# Patient Record
Sex: FEMALE | Race: WHITE | Employment: FULL TIME | URBAN - METROPOLITAN AREA
[De-identification: names, ages, dates, MRNs, and addresses within clinical notes are randomized per-mention and may not be internally consistent; named-entity substitution may affect disease eponyms.]

---

## 2017-08-28 ENCOUNTER — OFFICE VISIT (OUTPATIENT)
Dept: DERMATOLOGY | Age: 57
End: 2017-08-28

## 2017-08-28 DIAGNOSIS — D22.9 MULTIPLE NEVI: ICD-10-CM

## 2017-08-28 DIAGNOSIS — B07.9 VIRAL WARTS, UNSPECIFIED TYPE: ICD-10-CM

## 2017-08-28 DIAGNOSIS — L57.0 AK (ACTINIC KERATOSIS): ICD-10-CM

## 2017-08-28 DIAGNOSIS — R20.9 DISTURBANCE OF SKIN SENSATION: ICD-10-CM

## 2017-08-28 DIAGNOSIS — D23.9 DERMATOFIBROMA: ICD-10-CM

## 2017-08-28 DIAGNOSIS — L81.4 LENTIGO: ICD-10-CM

## 2017-08-28 DIAGNOSIS — Z85.828 HISTORY OF SKIN CANCER: Primary | ICD-10-CM

## 2017-08-28 PROCEDURE — 99213 OFFICE O/P EST LOW 20 MIN: CPT | Performed by: DERMATOLOGY

## 2017-08-28 PROCEDURE — 17000 DESTRUCT PREMALG LESION: CPT | Performed by: DERMATOLOGY

## 2017-08-28 PROCEDURE — 17003 DESTRUCT PREMALG LES 2-14: CPT | Performed by: DERMATOLOGY

## 2017-08-28 PROCEDURE — 17110 DESTRUCTION B9 LES UP TO 14: CPT | Performed by: DERMATOLOGY

## 2017-10-05 ENCOUNTER — OFFICE VISIT (OUTPATIENT)
Dept: DERMATOLOGY | Age: 57
End: 2017-10-05

## 2017-10-05 DIAGNOSIS — Z41.1 ELECTIVE PROCEDURE FOR UNACCEPTABLE COSMETIC APPEARANCE: ICD-10-CM

## 2017-10-05 DIAGNOSIS — L81.4 LENTIGINES: Primary | ICD-10-CM

## 2017-10-05 PROCEDURE — DM01515 PIGMENTED LASER 11-20 LESIONS: Performed by: DERMATOLOGY

## 2017-10-05 NOTE — MR AVS SNAPSHOT
After Visit Summary             Valentina Fowler   10/5/2017 2:00 PM   Office Visit    Description:  Female : 1960   Provider:  Shaan Prado MD   Department:  [facility]              Your Follow-Up and Future Appointments         Below is a list of your follow-up and future appointments. This may not be a complete list as you may have made appointments directly with providers that we are not aware of or your providers may have made some for you. Please call your providers to confirm appointments. It is important to keep your appointments. Please bring your current insurance card, photo ID, co-pay, and all medication bottles to your appointment. If self-pay, payment is expected at the time of service. Your To-Do List     Future Appointments Provider Department Dept Phone    2018 8:45 AM Shaan Prado MD [facility] [phone]    Please arrive 15 minutes prior to appointment, bring photo ID and insurance card. Information from Your Visit        Department     Name Address Phone Fax    [facility] [address] [address] [phone]      Vital Signs     Smoking Status                   Never Smoker           Instructions    Following the procedure, the treated area may be red and crusted and blistering or scabbing may occur. The affected areas should be treated delicately following treatment. Discomfort may be treated with the application of cool compresses and the area should be cleaned gently daily with mild soap and water and covered with Vaseline or aquaphor daily until healed. Improper care of the treated area while healing may increase the chance of scarring or skin textural changes to the treated area. Avoid tanning or self-tanners while undergoing treatment - tan skin increases your chances or dyspigmentation and complications. Call the office if you have fever, chills, or excess pain that is not relieved with Tylenol or inbuprofen, or any other concerns or questions following your treatment. Medications and Orders      Your Current Medications Are              lansoprazole (PREVACID) 15 MG capsule Take 15 mg by mouth daily. fluorouracil (EFUDEX) 5 % cream Apply topically qhs and cover with bandage. VAGIFEM 10 MCG TABS vaginal tablet     tretinoin (RETIN-A) 0.025 % cream Apply topically nightly. Allergies              Bee Venom     Bee stings         Additional Information        Basic Information     Date Of Birth Sex Race Ethnicity Preferred Language    1960 Female White Non-/Non  English      Preventive Care        Date Due    Hepatitis C screening is recommended for all adults regardless of risk factors born between [facility] at least once (lifetime) who have never been tested. 1960    HIV screening is recommended for all people regardless of risk factors  aged 15-65 years at least once (lifetime) who have never been HIV tested. 9/22/1977    Tetanus Combination Vaccine (1 - Tdap) 9/22/1981    Pap Smear 9/22/1983    Cholesterol Screening 9/22/2002    Mammograms are recommended every 2 years for low/average risk patients aged 48 - 69, and every year for high risk patients per updated national guidelines. However these guidelines can be individualized by your provider. 9/22/2012    Colonoscopy 9/22/2012    Yearly Flu Vaccine (1) 9/1/2017            MyChart Signup           [employer] allows you to send messages to your doctor, view your test results, renew your prescriptions, schedule appointments, view visit notes, and more. How Do I Sign Up? 1. In your Internet browser, go to [email or web address]  2. Click on the Sign Up Now link in the Sign In box. You will see the New Member Sign Up page. 3. Enter your [employer] Access Code exactly as it appears below. You will not need to use this code after youve completed the sign-up process. If you do not sign up before the expiration date, you must request a new code. [employer] Access Code: [email or web address]  Expires: 10/27/2017  8:53 AM    4. Enter your Social Security Number ([id number]) and Date of Birth (mm/dd/yyyy) as indicated and click Submit. You will be taken to the next sign-up page. 5. Create a XunLight ID. This will be your [employer] login ID and cannot be changed, so think of one that is secure and easy to remember. 6. Create a [employer] password. You can change your password at any time. 7. Enter your Password Reset Question and Answer. This can be used at a later time if you forget your password. 8. Enter your e-mail address. You will receive e-mail notification when new information is available in [address]. 9. Click Sign Up. You can now view your medical record. Additional Information  If you have questions, please contact the physician practice where you receive care. Remember, [employer] is NOT to be used for urgent needs. For medical emergencies, dial 911. For questions regarding your [employer] account call [phone]. If you have a clinical question, please call your doctor's office.

## 2017-10-05 NOTE — PROGRESS NOTES
[facility]  Sharon Herrera MD  [phone]      Laser Procedure Note       Vitaly Shelton   YOB: 1960    DATE OF VISIT:  10/5/2017    LASER: IPL - last was 1-2016  DIAGNOSIS: lentigo    Here for trx of increasing wrinkles and sunspots on the face and chest over the past few years. She has had IPL a few times by me in the past year and one other time in the past (by an IM friend in [place] > 5 years ago) with significant improvement in lentigines. No other trx. No complications with previous trx. No tan today - we discussed risks of dyspigmentation. Chest and face with multiple tan macules    Previous baseline:              PATIENT IDENTIFIED PER PROTOCOL: yes  LOCATION(S): chest and face, hands  VERIFIED AND MARKED: yes  TECHNIQUES, RISKS, BENEFITS AND ALTERNATIVES EXPLAINED: yes  CONSENT SIGNED, WITNESSED AND DATED: yes      OPERATIVE REPORT    DIAGNOSIS, LOCATION, PROCEDURE RECONFIRMED: yes   EYE PROTECTION: yes  ANESTHESIA/PRE-OP MEDICATIONS: none  LASER SETTINGS:  (1)  IPL - ST 3, 10-10.5 (previously at 9.5 and 10) J    PROCEDURE NOTE:  US gel applied to chest and arms and hands and these areas were treated with a single pass with the above settings. No complications noted. POST-OPERATIVE CARE/DISPOSITION: aquaphor prn  COMPLICATIONS: none  MEDICATIONS: none  WOUND CARE INSTRUCTIONS PROVIDED: yes    F/u 2-3 mos if needed.     250 today (200 - 250 in the past)

## 2017-11-08 ENCOUNTER — TELEPHONE (OUTPATIENT)
Dept: DERMATOLOGY | Age: 57
End: 2017-11-08

## 2017-11-08 NOTE — TELEPHONE ENCOUNTER
Pt c/b [phone]  Pt states:   - to have Dr. Stella Oneil to return her call  Please all to discuss thanks

## 2017-11-09 NOTE — TELEPHONE ENCOUNTER
Pt c/b [phone]  Pt states:   - would like to talk to Dr. Tanya Vela   - returning Dr. Tanya Vela call  Please call to discuss thanks

## 2018-01-04 ENCOUNTER — TELEPHONE (OUTPATIENT)
Dept: DERMATOLOGY | Age: 58
End: 2018-01-04

## 2018-01-04 NOTE — TELEPHONE ENCOUNTER
Pt is calling to schedule a quick 5 minute appt to have a wart frozen on her foot. Pt stated Dr. Maximino Mustafa said she'd squeeze her in.

## 2018-01-09 ENCOUNTER — OFFICE VISIT (OUTPATIENT)
Dept: DERMATOLOGY | Age: 58
End: 2018-01-09

## 2018-01-09 DIAGNOSIS — R20.9 DISTURBANCE OF SKIN SENSATION: ICD-10-CM

## 2018-01-09 DIAGNOSIS — B95.8 STAPH INFECTION: Primary | ICD-10-CM

## 2018-01-09 DIAGNOSIS — B07.8 OTHER VIRAL WARTS: ICD-10-CM

## 2018-01-09 PROCEDURE — 17110 DESTRUCTION B9 LES UP TO 14: CPT | Performed by: DERMATOLOGY

## 2018-01-09 PROCEDURE — 99213 OFFICE O/P EST LOW 20 MIN: CPT | Performed by: DERMATOLOGY

## 2018-01-11 ENCOUNTER — TELEPHONE (OUTPATIENT)
Dept: DERMATOLOGY | Age: 58
End: 2018-01-11

## 2018-01-11 LAB
GRAM STAIN RESULT: ABNORMAL
ORGANISM: ABNORMAL
WOUND/ABSCESS: ABNORMAL
WOUND/ABSCESS: ABNORMAL

## 2018-01-11 NOTE — TELEPHONE ENCOUNTER
----- Message from Amanda Restrepo MD sent at 1/10/2018 10:48 PM EST -----  Please let her know that her culture is growing staph. We'll update her with antibiotic sensitivities when they are back but I'm going to go ahead and send in bactroban/mupirocin ointment to use inside her nose twice daily for the next 10 days. Apply to both sides.

## 2018-02-26 ENCOUNTER — OFFICE VISIT (OUTPATIENT)
Dept: DERMATOLOGY | Age: 58
End: 2018-02-26

## 2018-02-26 DIAGNOSIS — B07.8 OTHER VIRAL WARTS: Primary | ICD-10-CM

## 2018-02-26 DIAGNOSIS — R52 PAIN: ICD-10-CM

## 2018-02-26 DIAGNOSIS — L21.9 SEBORRHEIC DERMATITIS: ICD-10-CM

## 2018-02-26 PROCEDURE — 99212 OFFICE O/P EST SF 10 MIN: CPT | Performed by: DERMATOLOGY

## 2018-02-26 PROCEDURE — 17110 DESTRUCTION B9 LES UP TO 14: CPT | Performed by: DERMATOLOGY

## 2018-02-26 RX ORDER — CLOBETASOL PROPIONATE 0.46 MG/ML
SOLUTION TOPICAL
Qty: 50 ML | Refills: 1 | Status: SHIPPED | OUTPATIENT
Start: 2018-02-26 | End: 2020-04-07 | Stop reason: SDUPTHER

## 2018-02-26 NOTE — PROGRESS NOTES
[facility]  Bia Sarkar MD  [phone]      Colin Godinez  1960    56 y.o. female     Date of Visit: 2/26/2018    Chief Complaint: wart, f/u sore in the nose, itchy scalp  Chief Complaint   Patient presents with    Verruca Vulgaris     doing well almost gone     Last seen: 1-2018  *she moved in 2017    History of Present Illness:    1. F/u for a wart on the L 2nd toe, present for > 1 year and persistent despite LN2 at a previous visits. Occasionally painful and spreading - had a few other lesions on the L 2nd toe and then one on the L 3rd toe. Improved with smaller lesions and a few have resolved but not clear. Has also been using efudex. 2. C/o pruritus along the occipital hairline. No improvement with attempted use of HC cream in this area. She had a sore on the L side of the nose - anterior part of the nostril. Culture with staph. Cleared with bactroban after last seen. Hx of NMSC - had FSE 8-2017. BCC - L inframammary area - curettage 5-2013. Peds radiologist at [facility]. Hx of breast cancer. Has been on tamoxifen. Sees [employer]. Review of Systems:  Gen: Feels well, good sense of health. Past Medical History:   Diagnosis Date    Cancer [facility])      Outpatient Prescriptions Marked as Taking for the 2/26/18 encounter (Office Visit) with Star Guzman MD   Medication Sig Dispense Refill    lansoprazole (PREVACID) 15 MG capsule Take 15 mg by mouth daily. Past Medical History, Family History, Surgical History, Medications and Allergies reviewed.     Physical Examination     Gen, well-appearing  The following were examined and determined to be normal: Psych/Neuro, face  The following were examined and determined to be abnormal: Nails/digits, scalp    Toes with small remaining mildly hyperkeratotic pinkish papules - 3 on the L 2nd and 1 on the L third toe  Nose clear  Occipital scalp with mild erythema and minimal scale    Assessment and Plan 1. Verruca - 3 - second L toe and 1 on the L third toe - symptomatic  - 4 lesion(s) treated with liquid nitrogen x 2 cycles. Patient educated on risk of blister, hypopigmentation/scar and wound care. - cont wartstick qam and efudex qhs with occlusion    2.  Mild seb derm - scalp  - Clobetasol soln for the occipital scalp daily - bid prn flares; ed se/misuse

## 2018-04-27 ENCOUNTER — TELEPHONE (OUTPATIENT)
Dept: DERMATOLOGY | Age: 58
End: 2018-04-27

## 2018-05-08 ENCOUNTER — OFFICE VISIT (OUTPATIENT)
Dept: DERMATOLOGY | Age: 58
End: 2018-05-08

## 2018-05-08 DIAGNOSIS — R52 PAIN: ICD-10-CM

## 2018-05-08 DIAGNOSIS — B07.8 OTHER VIRAL WARTS: Primary | ICD-10-CM

## 2018-05-08 PROCEDURE — 17110 DESTRUCTION B9 LES UP TO 14: CPT | Performed by: DERMATOLOGY

## 2018-05-08 RX ORDER — FLUOROURACIL 50 MG/G
CREAM TOPICAL
Qty: 40 G | Refills: 0 | Status: SHIPPED | OUTPATIENT
Start: 2018-05-08

## 2018-06-25 ENCOUNTER — OFFICE VISIT (OUTPATIENT)
Dept: DERMATOLOGY | Age: 58
End: 2018-06-25

## 2018-06-25 DIAGNOSIS — R20.9 DISTURBANCE OF SKIN SENSATION: ICD-10-CM

## 2018-06-25 DIAGNOSIS — L98.9 SKIN EROSION: ICD-10-CM

## 2018-06-25 DIAGNOSIS — B07.8 OTHER VIRAL WARTS: Primary | ICD-10-CM

## 2018-06-25 PROCEDURE — 17110 DESTRUCTION B9 LES UP TO 14: CPT | Performed by: DERMATOLOGY

## 2018-06-25 PROCEDURE — 99212 OFFICE O/P EST SF 10 MIN: CPT | Performed by: DERMATOLOGY

## 2018-08-27 ENCOUNTER — OFFICE VISIT (OUTPATIENT)
Dept: DERMATOLOGY | Age: 58
End: 2018-08-27

## 2018-08-27 DIAGNOSIS — L57.0 AK (ACTINIC KERATOSIS): ICD-10-CM

## 2018-08-27 DIAGNOSIS — D22.9 MULTIPLE NEVI: ICD-10-CM

## 2018-08-27 DIAGNOSIS — Z85.828 HISTORY OF NONMELANOMA SKIN CANCER: Primary | ICD-10-CM

## 2018-08-27 DIAGNOSIS — L81.4 LENTIGINES: ICD-10-CM

## 2018-08-27 PROCEDURE — 99213 OFFICE O/P EST LOW 20 MIN: CPT | Performed by: DERMATOLOGY

## 2018-08-27 PROCEDURE — 17000 DESTRUCT PREMALG LESION: CPT | Performed by: DERMATOLOGY

## 2018-08-27 NOTE — PROGRESS NOTES
Apply to affected area daily - BID PRN flares 50 mL 1    tretinoin (RETIN-A) 0.025 % cream Apply topically nightly. 45 g 3    lansoprazole (PREVACID) 15 MG capsule Take 15 mg by mouth daily. Allergies   Allergen Reactions    Bee Venom      Bee stings - PT advises she has been de-sensitized to        Past Medical History, Family History, Surgical History, Medications and Allergies reviewed. Physical Examination     Gen, well-appearing  The following were examined and determined to be normal: Psych/Neuro, Scalp/hair, Conjunctivae/eyelids, Gums/teeth/lips, Neck, Breast/axilla/chest, Abdomen, Back, RUE, LUE, RLE, LLE, Nails/digits and /buttocks. The following were examined and determined to be abnormal: Head/face. L 2nd toe with 2 tiny hyperkeratotic papules remaining  L third toe clear  Scar clear  trunk and extremities with scattered brown macules and papules and tan macules  L FH with erythematous roughly scaled macule     Assessment and Plan      1. Hx of NMSC, no signs recurrence  2. Benign-appearing nevi  3. Lentigines  - educ re ABCD's of MM   educ sun protection   encouraged skin check yearly (sooner if indicated), self checks    4. AK - L FH - LN2 - 1  - lesion(s) on the FH treated with liquid nitrogen x 2 cycles. Patient educated on risk of blister, hypopigmentation/scar and wound care.

## 2019-09-09 ENCOUNTER — OFFICE VISIT (OUTPATIENT)
Dept: DERMATOLOGY | Age: 59
End: 2019-09-09
Payer: COMMERCIAL

## 2019-09-09 DIAGNOSIS — L81.4 LENTIGINES: ICD-10-CM

## 2019-09-09 DIAGNOSIS — L57.0 AK (ACTINIC KERATOSIS): ICD-10-CM

## 2019-09-09 DIAGNOSIS — D22.9 MULTIPLE NEVI: ICD-10-CM

## 2019-09-09 DIAGNOSIS — Z85.828 HISTORY OF NONMELANOMA SKIN CANCER: Primary | ICD-10-CM

## 2019-09-09 PROCEDURE — 99213 OFFICE O/P EST LOW 20 MIN: CPT | Performed by: DERMATOLOGY

## 2019-09-09 PROCEDURE — 17000 DESTRUCT PREMALG LESION: CPT | Performed by: DERMATOLOGY

## 2019-09-09 NOTE — PROGRESS NOTES
[facility]  Saba Cuadra MD  [phone]      Henrietta Haver  1960    66 y.o. female     Date of Visit: 9/9/2019    Chief Complaint: f/u skin cancer, moles/lesions  Chief Complaint   Patient presents with    Skin Exam     Last seen: 8-2018  *daughter looking at [employer] - sports broadcasting  *has had some family probs recently - spouse has had probs with alcohol - has changed emergency contacts to her sister    History of Present Illness:    1. Here for f/u for skin cancer. Hx of NMSC - here for full skin check. No probs with previous site. No new concerns. 2. She is also here for evaluation of multiple asx nevi on the trunk and extremities, present for many years; no change in size/shape/color of any lesions; no bleeding lesions. She also has a DF on the L thigh that is asx and unchanged. No personal hx of skin cancer. Father with hx of NMSC.     3. Lentigines on the face/chest.  She had IPL in the past with marked improvement in lentigines and no complications. No other trx. 4. She has a rough lesion on the L FH. Asx. Other probs:  - wart on the L 2nd toe - finally cleared with LN2 and efudex  - sore on the L side of the nose - anterior part of the nostril. Culture with staph. Cleared with bactroban. - few intermittent pruritic areaspapules on the face - given soolantra    BCC - L inframammary area - curettage 5-2013. Peds radiologist at [facility]. Hx of breast cancer. Has been on tamoxifen. Sees [employer]. Review of Systems:  Gen: Feels well, good sense of health.   Skin: no changing moles/lesions, no new rashes    Past Medical History:   Diagnosis Date    Cancer [facility])        Past Surgical History:   Procedure Laterality Date    BREAST SURGERY      bilateral w/reconstruction       Outpatient Medications Marked as Taking for the 9/9/19 encounter (Office Visit) with Alisson Boogie MD   Medication Sig Dispense Refill    clobetasol (TEMOVATE) 0.05 %

## 2020-04-07 RX ORDER — CLOBETASOL PROPIONATE 0.46 MG/ML
SOLUTION TOPICAL
Qty: 50 ML | Refills: 1 | Status: SHIPPED | OUTPATIENT
Start: 2020-04-07 | End: 2022-01-12

## 2021-01-28 NOTE — TELEPHONE ENCOUNTER
Pt calling states she needs medication refill on medication  Mupirocin pls send to [facility] on [address] any questions pls return pt call back @ [phone] to discuss

## 2021-02-18 ENCOUNTER — TELEPHONE (OUTPATIENT)
Dept: DERMATOLOGY | Age: 61
End: 2021-02-18

## 2021-03-02 ENCOUNTER — TELEPHONE (OUTPATIENT)
Dept: DERMATOLOGY | Age: 61
End: 2021-03-02

## 2021-03-02 NOTE — TELEPHONE ENCOUNTER
Dr Boyle Hires patient  Pt c/b #[phone]  Pt stated  Needs to cancel/reschedule appt that is on 3.8.21  Please c/b to discuss

## 2021-03-29 ENCOUNTER — OFFICE VISIT (OUTPATIENT)
Dept: DERMATOLOGY | Age: 61
End: 2021-03-29
Payer: COMMERCIAL

## 2021-03-29 VITALS — TEMPERATURE: 97.3 F

## 2021-03-29 DIAGNOSIS — L21.9 SEBORRHEIC DERMATITIS: ICD-10-CM

## 2021-03-29 DIAGNOSIS — Z85.828 HISTORY OF NONMELANOMA SKIN CANCER: Primary | ICD-10-CM

## 2021-03-29 DIAGNOSIS — L81.4 LENTIGINES: ICD-10-CM

## 2021-03-29 DIAGNOSIS — L57.0 AK (ACTINIC KERATOSIS): ICD-10-CM

## 2021-03-29 DIAGNOSIS — D22.9 MULTIPLE NEVI: ICD-10-CM

## 2021-03-29 DIAGNOSIS — L30.9 DERMATITIS: ICD-10-CM

## 2021-03-29 PROCEDURE — 99214 OFFICE O/P EST MOD 30 MIN: CPT | Performed by: DERMATOLOGY

## 2021-03-29 RX ORDER — TRIAMCINOLONE ACETONIDE 1 MG/G
CREAM TOPICAL
Qty: 30 G | Refills: 2 | Status: SHIPPED | OUTPATIENT
Start: 2021-03-29

## 2021-03-29 NOTE — PROGRESS NOTES
Counts include 234 beds at the [facility]  Marisol Wills MD  [phone]      Jani Powell  1960    64 y.o. female     Date of Visit: 3/29/2021    Chief Complaint: f/u skin cancer, moles/lesions  Chief Complaint   Patient presents with    Skin Exam     FSE        HX:NMSC     Last seen: 9-2019  *daughter is a patient - [facility] in [employer] - sports broadcasting  *had some family probs in 2019 - spouse had probs with alcohol - has changed emergency contacts to her sister    History of Present Illness:    1. Here for f/u for skin cancer. Hx of NMSC - here for full skin check. No probs with previous site. No new concerns. 2. She is also here for evaluation of multiple asx nevi on the trunk and extremities, present for many years; no change in size/shape/color of any lesions; no bleeding lesions. She also has a DF on the L thigh that is asx and unchanged. No personal hx of skin cancer. Father with hx of NMSC.     3. Lentigines on the face/chest.  She had IPL in the past with marked improvement in lentigines and no complications. No other trx. 4. F/u AK's - no new concerns except for dry area near the frontal hairline - wax/wane x many mos. No trx tried. 5. F/u seb derm - clobetasol soln helps prn flares    6. C/o itchy and dry ears x many mos. No trx tried. Other probs:  - wart on the L 2nd toe - finally cleared with LN2 and efudex  - sore on the L side of the nose - anterior part of the nostril. Culture with staph. Cleared with bactroban. - few intermittent pruritic areaspapules on the face - given soolantra    BCC - L inframammary area - curettage 5-2013. Peds radiologist at [facility]. Hx of breast cancer. Has been on tamoxifen. Sees [employer]. Review of Systems:  Gen: Feels well, good sense of health.   Skin: no changing moles/lesions, no new rashes    Past Medical History:   Diagnosis Date    Cancer [facility])        Past Surgical History:   Procedure Laterality Date    BREAST

## 2021-03-29 NOTE — PATIENT INSTRUCTIONS
Systane eye drops      Protecting Yourself From the Sun    · Apply an over-the-counter broad spectrum water resistant sunscreen with an SPF of at least 30 to exposed areas of the skin. Dont forget the ears and lips! Remember to reapply sunscreen about every 2 hours and after swimming or sweating. · Wear sun protective clothing. Swim shirts (aka. rash guards) are a great idea and negates the need to reapply sunscreen in those areas. · Seek the shade whenever possible especially between the hours of 10 am and 4 pm when the suns rays are the strongest.     · Avoid tanning beds      Cryosurgery (Freezing) Wound Care Instructions    AFTER THE PROCEDURE:    You will notice swelling and redness around the site. This is normal.    You may experience a sharp or sore feeling for the next several days. For this discomfort, you may take acetaminophen (Tylenol©).  A blister may develop at the treated area, sometimes as soon as by the end of the day. After several days, the blister will subside and a scab will form.  If the area is bumped or traumatized during the first few days following freezing, you may develop bleeding into the blister, forming a blood blister. This is nothing to be alarmed about.  If the blister is tense, uncomfortable, or much larger than the site that was frozen, you may pop the blister along its edge with a sterile needle (boiled, heated under a flame, or cleaned with alcohol) to allow the fluid to drain out. If the blister does not bother you, no treatment is needed.  Do NOT peel off the top of the blister roof. It will act as a dressing on top of your wound. WOUND CARE:    You may shower or bathe as usual, but avoid scrubbing the areas that have been frozen.  Cleanse the site twice a day with mild soapy water, and then apply a thin film of white petrolatum (Vaseline©).  You do not need to cover the area, but can if you prefer.     Do NOT allow the site to become dry or crusted, or attempt to dry it out with rubbing alcohol or hydrogen peroxide.  Continue this regimen until the area is pink and healed. Depending on the size and location of your cryosurgery site, healing may take 2 to 4 weeks.  The area may continue to be pink for several weeks, and over the next few months may become darker or lighter than the surrounding skin. This may be a permanent change.    

## 2022-01-12 RX ORDER — CLOBETASOL PROPIONATE 0.46 MG/ML
SOLUTION TOPICAL
Qty: 50 ML | Refills: 1 | Status: SHIPPED | OUTPATIENT
Start: 2022-01-12

## 2022-05-17 ENCOUNTER — TELEPHONE (OUTPATIENT)
Dept: DERMATOLOGY | Age: 62
End: 2022-05-17

## 2022-05-31 ENCOUNTER — OFFICE VISIT (OUTPATIENT)
Dept: DERMATOLOGY | Age: 62
End: 2022-05-31
Payer: COMMERCIAL

## 2022-05-31 DIAGNOSIS — Z85.828 HISTORY OF NONMELANOMA SKIN CANCER: ICD-10-CM

## 2022-05-31 DIAGNOSIS — L21.9 SEBORRHEIC DERMATITIS: ICD-10-CM

## 2022-05-31 DIAGNOSIS — L30.9 DERMATITIS: ICD-10-CM

## 2022-05-31 DIAGNOSIS — L81.4 LENTIGINES: ICD-10-CM

## 2022-05-31 DIAGNOSIS — L57.0 AK (ACTINIC KERATOSIS): ICD-10-CM

## 2022-05-31 DIAGNOSIS — D22.9 MULTIPLE NEVI: ICD-10-CM

## 2022-05-31 DIAGNOSIS — D48.5 NEOPLASM OF UNCERTAIN BEHAVIOR OF SKIN: Primary | ICD-10-CM

## 2022-05-31 PROCEDURE — 17000 DESTRUCT PREMALG LESION: CPT | Performed by: DERMATOLOGY

## 2022-05-31 PROCEDURE — 99214 OFFICE O/P EST MOD 30 MIN: CPT | Performed by: DERMATOLOGY

## 2022-05-31 PROCEDURE — 11102 TANGNTL BX SKIN SINGLE LES: CPT | Performed by: DERMATOLOGY

## 2022-05-31 PROCEDURE — 17003 DESTRUCT PREMALG LES 2-14: CPT | Performed by: DERMATOLOGY

## 2022-05-31 NOTE — PROGRESS NOTES
[facility]at[facility]  Noemi Forman MD  [phone]      Duane Fick  1960    63 y.o. 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 [facility]  *had some family probs in 2019 - spouse had probs with alcohol - has changed emergency contacts to her sister   *may be moving to [facility]    History of Present Illness:    1. Here for a lesion on the back - present since Jan/Feb 2022  Tried treating with 5-FU but no improvement and looking worse. 2. Here for f/u for skin cancer. Hx of NMSC - here for full skin check. No probs with previous site. No new concerns. 3. She is also here for evaluation of multiple asx nevi on the trunk and extremities, present for many years; no change in size/shape/color of any lesions; no bleeding lesions. She also has a DF on the L thigh that is asx and unchanged. No personal hx of skin cancer. Father with hx of NMSC.     4. Lentigines on the face/chest.  She had IPL in the past with marked improvement in lentigines and no complications. No other trx.    5. F/u AK's - no new concerns except for dry area near the frontal hairline and cheeks. Asx.     6. F/u seb derm/dermatitis - clobetasol soln helps scalp prn flares and TAC on the ears prn flares. Other probs:  - wart on the L 2nd toe - finally cleared with LN2 and efudex  - sore on the L side of the nose - anterior part of the nostril. Culture with staph. Cleared with bactroban. - few intermittent pruritic areaspapules on the face - given soolantra    BCC - L inframammary area - curettage 5-2013. Peds radiologist at [facility]. Hx of breast cancer. Has been on tamoxifen. Sees [employer].     Review of Systems:  Gen: Feels well, good sense of Benign-appearing nevi  4. Lentigines  - educ re si/sx/ABCD's of MM   educ sun protection - OTC sunscreen with SPF 30-50+ recommended and reviewed usage  encouraged skin check yearly (sooner if indicated), self checks    5. AK's - 1 near the frontal hairline, R lower cheek and L infraorbital  - 3 lesion(s) treated with liquid nitrogen x 2 cycles after verbal consent from patient. Patient educated on risk of blister, hypopigmentation/scar and wound care. - plan for bx of frontal hairline lesion if persistent   - cont sun protection as above    6a. seb derm - stable  - cont clobetasol prn flares; ed se/misuse    6b.  Dermatitis - ears - mild  - TAC cream prn flares; ed se/misuse

## 2022-05-31 NOTE — PATIENT INSTRUCTIONS
Biopsy Wound Care Instructions    · Keep the bandage in place for 24 hours. · Cleanse the wound with mild soapy water daily   Gently dry the area.  Apply Vaseline or petroleum jelly to the wound using a cotton tipped applicator.  Cover with a clean bandage.  Repeat this process until the biopsy site is healed.  If you had stitches placed, continue treating the site until the stitches are removed. Remember to make an appointment to return to have your stitches removed by our staff.  You may shower and bathe as usual.       ** Biopsy results generally take around 7 business days to come back. If you have not heard from us by then, please call the office at [phone]. *Please note that biopsy results are released to both the patient and physician at the same time in [address] Please allow time for your physician to review the results. One of our staff members will reach out to you with the results and plan.

## 2022-06-02 LAB — DERMATOLOGY PATHOLOGY REPORT: NORMAL

## 2023-02-03 RX ORDER — CLOBETASOL PROPIONATE 0.46 MG/ML
SOLUTION TOPICAL
Qty: 50 ML | Refills: 1 | Status: SHIPPED | OUTPATIENT
Start: 2023-02-03

## 2023-05-18 ENCOUNTER — TELEPHONE (OUTPATIENT)
Dept: DERMATOLOGY | Age: 63
End: 2023-05-18

## 2023-05-18 ENCOUNTER — OFFICE VISIT (OUTPATIENT)
Dept: DERMATOLOGY | Age: 63
End: 2023-05-18

## 2023-05-18 DIAGNOSIS — L81.4 LENTIGINES: ICD-10-CM

## 2023-05-18 DIAGNOSIS — D22.9 MULTIPLE NEVI: ICD-10-CM

## 2023-05-18 DIAGNOSIS — L71.9 ROSACEA: Primary | ICD-10-CM

## 2023-05-18 DIAGNOSIS — D48.5 NEOPLASM OF UNCERTAIN BEHAVIOR OF SKIN: ICD-10-CM

## 2023-05-18 DIAGNOSIS — L30.9 DERMATITIS: ICD-10-CM

## 2023-05-18 DIAGNOSIS — L57.0 AK (ACTINIC KERATOSIS): ICD-10-CM

## 2023-05-18 DIAGNOSIS — L21.9 SEBORRHEIC DERMATITIS: ICD-10-CM

## 2023-05-18 DIAGNOSIS — Z85.828 HISTORY OF NONMELANOMA SKIN CANCER: ICD-10-CM

## 2023-05-18 RX ORDER — TRIAMCINOLONE ACETONIDE 1 MG/G
CREAM TOPICAL
Qty: 30 G | Refills: 2 | Status: SHIPPED | OUTPATIENT
Start: 2023-05-18

## 2023-05-18 RX ORDER — CLOBETASOL PROPIONATE 0.46 MG/ML
SOLUTION TOPICAL
Qty: 50 ML | Refills: 1 | Status: SHIPPED | OUTPATIENT
Start: 2023-05-18

## 2023-05-18 RX ORDER — ATORVASTATIN CALCIUM 40 MG/1
40 TABLET, FILM COATED ORAL DAILY
COMMUNITY
Start: 2023-04-20

## 2023-05-18 RX ORDER — MINOCYCLINE HYDROCHLORIDE 50 MG/1
CAPSULE ORAL
Qty: 60 CAPSULE | Refills: 1 | Status: SHIPPED | OUTPATIENT
Start: 2023-05-18

## 2023-05-18 NOTE — TELEPHONE ENCOUNTER
Pt says that her forehead looks and feels like it has a sunburn on it. She has not been out in the sun. It is tender to the touch. She has an appt 5/30/23 but she is wondering if there is any way she can get in sooner?     Phone is [phone]

## 2023-05-18 NOTE — PROGRESS NOTES
diagnosis and treatment options  - start metrocream BID  - start minocycline 50 mg twice daily-stop if any headache, vision changes or let us know if GI side effects; stop after a few weeks if clears otherwise can continue for up to 2 to 3 months    2. Hx of NMSC, no signs recurrence  3. Benign-appearing nevi  4. Lentigines  - Monitor for ABCD's of MM and si/sx of NMSC  Continue sun protection - OTC sunscreen with SPF 30-50+ recommended and reviewed usage  Encouraged skin check yearly (sooner if indicated), self checks    5. AK's - 1 R cheek  - - 1 lesion(s) treated with liquid nitrogen with cryac or swab. Treated with 2 cycles for 1-5 seconds each after consent from patient. Patient educated on risk of blister, hypopigmentation/scar and wound care. Tolerated well. - cont sun protection as above    6a. seb derm - stable  - cont clobetasol prn flares; ed se/misuse    6b. Dermatitis - ears - mild  - TAC cream prn flares; ed se/misuse    7. R/o BCC - R supraclavicular area  - Shave biopsy performed after verbal consent obtained. Patient educated regarding risk of bleeding, infection, scar and educated on wound care. Skin cleansed with alcohol pad and site anesthetized with lido + epi. Aluminum chloride applied to site for hemostasis. Petrolatum ointment and bandage applied. Specimen bottle labeled with patient information and site and specimen sent to [facility].

## 2023-05-18 NOTE — PATIENT INSTRUCTIONS

## 2023-05-18 NOTE — TELEPHONE ENCOUNTER
Patient's appointment moved up from May 30, 2023 to May 18-today. Patient wanted to be seen before original appointment due to patient's forehead is red like a sunburn and has not been in the sun. The area is red and tender x a few weeks.

## 2023-05-23 ENCOUNTER — PATIENT MESSAGE (OUTPATIENT)
Dept: DERMATOLOGY | Age: 63
End: 2023-05-23

## 2023-05-23 LAB — DERMATOLOGY PATHOLOGY REPORT: ABNORMAL

## 2023-05-23 NOTE — TELEPHONE ENCOUNTER
From: Devon Mendez  To: Dr. Rosen Punch: 5/23/2023 2:27 PM EDT  Subject: Path result [address]    Do I need any additional resection or treatment?   Trey Reasons

## 2023-05-25 ENCOUNTER — TELEPHONE (OUTPATIENT)
Dept: DERMATOLOGY | Age: 63
End: 2023-05-25

## 2023-05-25 NOTE — TELEPHONE ENCOUNTER
Pt calling to speak to Madalyn Calhoun regarding her biopsy report pls return call back @ [phone] to discuss

## 2023-08-17 ENCOUNTER — PROCEDURE VISIT (OUTPATIENT)
Dept: DERMATOLOGY | Age: 63
End: 2023-08-17

## 2023-08-17 DIAGNOSIS — C44.519 BASAL CELL CARCINOMA (BCC) OF CLAVICULAR AREA: Primary | ICD-10-CM

## 2023-08-17 NOTE — PATIENT INSTRUCTIONS
Care of Wound Closed with Dermabond    A special skin glue called Dermabond was used to hold your wound together on the surface of the skin. The glue film will loosen from your skin on its own as the wound heals. Follow these care guidelines:    Keep the wound dry. Do not pick, scratch or rub the glue on the wound so it does not loosen before the wound heals. Do not soak your wound in water until the glue film falls off. Avoid swimming or using a hot tub while the glue is in place. When you shower or bathe, let water run over the wound but do not rub. Pat the wound gently with a soft towel to dry. Do no apply any cream, lotion or ointment to the skin near the wound. It could loosen the glue before the wound heals. Do not apply any tape, sticky dressing, or alcohol to the glue site for 10 days. These could also loosen the glue. Call your doctor if you have any of these signs of infection:    Skin around the wound is more red, swollen or feels hot. Fluid builds up under the glue    Wound smells bad    Pus drainage    Fever     Increasing pain    Surgical Wound Care Instructions    After your surgery, go home and take it easy. Please refrain from any vigorous physical activity or heavy lifting for 14 days. Leave the pressure bandage in place for 48 hours. If it starts to detach, reinforce the bandage with another piece of tape. Please keep the bandage dry for 48 hours. After 48 hours, the wound can get wet but do not soak or scrub the area. Apply a non stick bandage or non stick gauze pad to the site daily with medical tape for a total of 14 days. Complications:  If bleeding develops when you go home, apply pressure for 15 minutes continuously. A small amount of ice in a bag covered with a towel may be used for another 10 minutes if necessary.   If the bleeding does not stop, please call your dermatologist.  Please call the office if you develop any fevers, chills or pus drains

## 2023-08-17 NOTE — PROGRESS NOTES
[facility] Dermatology  Hyun Mann MD  [facility]  1960    63 y.o. female     Date of Visit: 8/17/2023    Chief Complaint: [facility]  Chief Complaint   Patient presents with    Procedure      R clavicular area [facility]     Last seen: 5-2023   *daughter is a patient - [employer] in [place] - sports broadcasting  *other daughter going to [address] [place] [address]  *had some family probs in 2019 - spouse had probs with alcohol - has changed emergency contacts to her sister   *may be moving to [place]    History of Present Illness:    Here for excision of a nodular BCC on the right clavicular area. No problems since biopsy. RIGHT CLAVICULAR AREA- Basal Cell Carcinoma, Nodular Type  - bx 5-2023    No blood thinners, no pacemaker, no defibrillator. Other probs:  - wart on the L 2nd toe - finally cleared with LN2 and efudex  - sore on the L side of the nose - anterior part of the nostril. Culture with staph. Cleared with bactroban. - few intermittent pruritic areaspapules on the face - given soolantra    BCC - L inframammary area - curettage 5-2013. Peds radiologist at [facility]. Hx of breast cancer. Has been on tamoxifen. Sees [employer]. Review of Systems:  Gen: Feels well, good sense of health. Past Medical History:   Diagnosis Date    Cancer [facility])        Past Surgical History:   Procedure Laterality Date    BREAST SURGERY      bilateral w/reconstruction       Outpatient Medications Marked as Taking for the 8/17/23 encounter (Procedure visit) with Meryle Song, MD   Medication Sig Dispense Refill    atorvastatin (LIPITOR) 40 MG tablet Take 1 tablet by mouth daily      clobetasol (TEMOVATE) 0.05 % external solution Apply topically 2 times daily. 50 mL 1    triamcinolone (KENALOG) 0.1 % cream Apply to affected area BID PRN flares 30 g 2    mupirocin (BACTROBAN) 2 % ointment Apply 2 times daily inside the nose.  22 g 1    fluorouracil (EFUDEX) 5 %

## 2023-08-22 LAB — DERMATOLOGY PATHOLOGY REPORT: ABNORMAL

## 2023-11-06 ENCOUNTER — TELEPHONE (OUTPATIENT)
Dept: DERMATOLOGY | Age: 63
End: 2023-11-06

## 2023-11-06 NOTE — TELEPHONE ENCOUNTER
Pt asking her appt for her 3:00 appt on 11/13 to be rescheduled to either December 11, 12, 18, if possible.     [phone]

## 2023-12-28 ENCOUNTER — TELEPHONE (OUTPATIENT)
Dept: DERMATOLOGY | Age: 63
End: 2023-12-28

## 2023-12-28 DIAGNOSIS — C44.319 BASAL CELL CARCINOMA (BCC) OF BROW: Primary | ICD-10-CM

## 2023-12-28 NOTE — TELEPHONE ENCOUNTER
The patient has been notified of this information and all questions answered. Referral placed to [facility] for patient scheduling.

## 2024-04-02 ENCOUNTER — PROCEDURE VISIT (OUTPATIENT)
Dept: SURGERY | Age: 64
End: 2024-04-02
Payer: COMMERCIAL

## 2024-04-02 VITALS — SYSTOLIC BLOOD PRESSURE: 118 MMHG | HEART RATE: 82 BPM | DIASTOLIC BLOOD PRESSURE: 77 MMHG

## 2024-04-02 DIAGNOSIS — C44.319 BASAL CELL CARCINOMA OF BROW: Primary | ICD-10-CM

## 2024-04-02 PROCEDURE — 12053 INTMD RPR FACE/MM 5.1-7.5 CM: CPT | Performed by: DERMATOLOGY

## 2024-04-02 PROCEDURE — 17312 MOHS ADDL STAGE: CPT | Performed by: DERMATOLOGY

## 2024-04-02 PROCEDURE — 17311 MOHS 1 STAGE H/N/HF/G: CPT | Performed by: DERMATOLOGY

## 2024-04-02 RX ORDER — TIRZEPATIDE 2.5 MG/.5ML
INJECTION, SOLUTION SUBCUTANEOUS WEEKLY
COMMUNITY
Start: 2024-03-23

## 2024-04-02 NOTE — PROGRESS NOTES
MOHS PROCEDURE NOTE    PHYSICIAN:  Kell Donahue MD, Who operated in two distinct and integrated capacities as the surgeon removing the tissue and as the pathologist examining the tissue.    ASSISTANT: Dillan Stone RN; Esvin Smith RN     REFERRING PROVIDER:  Kell Crowley MD    PREOPERATIVE DIAGNOSIS: Superficial Basal Cell Carcinoma     SPECIFIC MOHS INDICATIONS:  location, clinically ill-defined borders, and need for tissue conservation    AUC SCORIN/9    POSTOPERATIVE DIAGNOSIS: SAME    LOCATION: Left Lateral Brow    OPERATIVE PROCEDURE:  MOHS MICROGRAPHIC SURGERY    RECONSTRUCTION OF DEFECT: Intermediate layered closure    PREOPERATIVE SIZE: 5 x 3 MM    DEFECT SIZE: 22 x 18 MM    LENGTH OF REPAIRED WOUND/SIZE OF FLAP/SIZE OF GRAFT:  51 MM    ANESTHESIA:  13 mL 1% lidocaine with epinephrine 1:100,000 buffered.     EBL:  MINIMAL    DURATION OF PROCEDURE:  3 HOURS AND 30 MIN    POSTOPERATIVE OBSERVATION: 45 MIN    SPECIMENS:  SEE MOHS MAP    COMPLICATIONS:  NONE    DESCRIPTION OF PROCEDURE:  The patient was given a mirror, as appropriate, and the biopsy site was identified, marked with a surgical marking pen, and verified by the patient.   Options for treatment were discussed and the patient was informed that Mohs surgery was the selected treatment based on its lower recurrence rate, given the features listed above, as compared to other treatment modalities such as excision, radiation, or curettage, and agreed with this treatment plan.  Risks and benefits including bruising, swelling, bleeding, infection, nerve injury, recurrence, and scarring were discussed with the patient prior to the procedure and a written consent detailing these and other risks was reviewed with the patient and signed.    There was a time out for person and procedure verification.  The surgical site was prepped with an antiseptic solution.  Application of an antiseptic solution was repeated before each surgical stage.

## 2024-04-02 NOTE — PROGRESS NOTES
PRE-PROCEDURE SCREENING    Pacemaker/ICD: No  Difficulty with numbing in the past: No  Local Anesthesia Reaction/passing out: No  Latex or adhesive allergy:  Yes, band-aids  Any history of reaction to suture or skin glue:  no  Bleeding/Clotting Disorders: No  Anticoagulant Therapy: No  Joint prosthesis: No  Artificial Heart Valve: No  Stroke or Seizures: No  Organ Transplant or Lymphoma: No  Immunosuppression: No  Respiratory Problems: No

## 2024-04-03 ENCOUNTER — TELEPHONE (OUTPATIENT)
Dept: SURGERY | Age: 64
End: 2024-04-03

## 2024-04-03 NOTE — TELEPHONE ENCOUNTER
The patient was in the office on 04/02/2024 for Mohs located on the LT lateral brow with ILC repair.  The patient tolerated the procedure well and left the office in good condition.    Pain level on post-operative day 1:  pain level - upon awakening this am 5 - Tylenol had worn off and after taking, fully subsided.      Any bleeding episode that required pressure to be held, bandage change or a call to the office or MD?  no     Any other issues?:  no    A post-operative telephone call was placed at 9:31a, 04/03/2024, in order to check on the patient's recovery process.  The patient reported doing well and had no complaints other than those listed above, if any.  All of the patient's questions were answered. Advised to call w/any questions/concerns.

## 2024-05-22 ENCOUNTER — PATIENT MESSAGE (OUTPATIENT)
Dept: DERMATOLOGY | Age: 64
End: 2024-05-22

## 2024-05-23 NOTE — TELEPHONE ENCOUNTER
From: Zee TRIANA  To: Dr. Kell Crowley  Sent: 5/22/2024 7:53 PM EDT  Subject: Rescheduling request     I have an appointment on June 11, any chance I can move it to May 31??  Susan Garrett

## 2024-07-25 ENCOUNTER — OFFICE VISIT (OUTPATIENT)
Dept: DERMATOLOGY | Age: 64
End: 2024-07-25

## 2024-07-25 DIAGNOSIS — Z85.828 HISTORY OF NONMELANOMA SKIN CANCER: ICD-10-CM

## 2024-07-25 DIAGNOSIS — L57.0 AK (ACTINIC KERATOSIS): ICD-10-CM

## 2024-07-25 DIAGNOSIS — L30.9 DERMATITIS: ICD-10-CM

## 2024-07-25 DIAGNOSIS — D22.9 MULTIPLE NEVI: ICD-10-CM

## 2024-07-25 DIAGNOSIS — L71.9 ROSACEA: Primary | ICD-10-CM

## 2024-07-25 DIAGNOSIS — L81.4 LENTIGINES: ICD-10-CM

## 2024-07-25 DIAGNOSIS — L21.9 SEBORRHEIC DERMATITIS: ICD-10-CM

## 2024-07-25 NOTE — PROGRESS NOTES
[facility] Dermatology  Kell Crowley MD  [phone]      Zee TRIANA  1960    63 y.o. female     Date of Visit: 7/25/2024    Chief Complaint: f/u skin cancer, moles/lesions  Chief Complaint   Patient presents with    Skin Exam    Follow-up     Last seen:    *daughter is a patient - [email or web address] in [place] - sports broadcasting  *younger daughter going to [employer] - studying neuroscience; studied abroad summer 2024    *had some family probs in 2019 - spouse had probs with alcohol - has changed emergency contacts to her sister   *[place] moving to [place] years    History of Present Illness:    1. F/u rosacea - seen previously w flaring eruption/erythema on the FH and was itchy, hot, flaky.    Resolved with minocycline and MetroCream and has not needed either recently.    2. Here for f/u for skin cancer.  Hx of NMSC - here for full skin check.    Most recently s/p Mohs 4-2024 (5 stages) for BCC on the L lateral brow/temple - has healed very well.  Initially felt tight but feels normal now and looks great.    No probs with previous sites.  No new concerns.    3. She is also here for evaluation of multiple asx nevi on the trunk and extremities, present for many years; no change in size/shape/color of any lesions; no bleeding lesions.  She also has a DF on the L thigh that is asx and unchanged.  No personal hx of skin cancer.  Father with hx of NMSC.     4. Lentigines on the face/chest.  She had IPL in the past with marked improvement in lentigines and no complications.  No other trx.    5. F/u AK's - few new concerns on the L FH.  Asx.     6. F/u seb derm/dermatitis - clobetasol soln helps scalp prn flares and TAC on the ears and hairline prn flares.  Notes that she is using bio-oil all over the face and better control of dryness w this.    Other probs:  - wart on the L 2nd toe - finally cleared with LN2 and efudex  - sore on the L side of the nose - anterior part of the

## 2024-12-20 ENCOUNTER — PATIENT MESSAGE (OUTPATIENT)
Age: 64
End: 2024-12-20

## 2024-12-30 NOTE — TELEPHONE ENCOUNTER
Called patient to check in with [email or web address] message sent regarding her eyelids.     Patient advised me that everything has calmed down and ok to keep appointment on 1/20/2025.    Patient advised to use Aquaphor on eyelids until her appointment.    Metronidazole (filled 2023) refill sent-she wasn't sure if rosacea was effecting her eyelids.

## 2025-01-20 ENCOUNTER — OFFICE VISIT (OUTPATIENT)
Age: 65
End: 2025-01-20
Payer: COMMERCIAL

## 2025-01-20 DIAGNOSIS — Z85.828 HISTORY OF NONMELANOMA SKIN CANCER: ICD-10-CM

## 2025-01-20 DIAGNOSIS — L21.9 SEBORRHEIC DERMATITIS: ICD-10-CM

## 2025-01-20 DIAGNOSIS — D48.5 NEOPLASM OF UNCERTAIN BEHAVIOR OF SKIN: ICD-10-CM

## 2025-01-20 DIAGNOSIS — L71.9 ROSACEA: Primary | ICD-10-CM

## 2025-01-20 DIAGNOSIS — L81.4 LENTIGINES: ICD-10-CM

## 2025-01-20 DIAGNOSIS — L30.9 DERMATITIS: ICD-10-CM

## 2025-01-20 DIAGNOSIS — D22.9 MULTIPLE NEVI: ICD-10-CM

## 2025-01-20 DIAGNOSIS — B07.0 PLANTAR WART: ICD-10-CM

## 2025-01-20 DIAGNOSIS — L57.0 AK (ACTINIC KERATOSIS): ICD-10-CM

## 2025-01-20 DIAGNOSIS — L65.9 HAIR THINNING: ICD-10-CM

## 2025-01-20 PROCEDURE — 17003 DESTRUCT PREMALG LES 2-14: CPT | Performed by: DERMATOLOGY

## 2025-01-20 PROCEDURE — 17110 DESTRUCTION B9 LES UP TO 14: CPT | Performed by: DERMATOLOGY

## 2025-01-20 PROCEDURE — 11102 TANGNTL BX SKIN SINGLE LES: CPT | Performed by: DERMATOLOGY

## 2025-01-20 PROCEDURE — 11103 TANGNTL BX SKIN EA SEP/ADDL: CPT | Performed by: DERMATOLOGY

## 2025-01-20 PROCEDURE — 99214 OFFICE O/P EST MOD 30 MIN: CPT | Performed by: DERMATOLOGY

## 2025-01-20 PROCEDURE — 17000 DESTRUCT PREMALG LESION: CPT | Performed by: DERMATOLOGY

## 2025-01-20 NOTE — PROGRESS NOTES
[facility] Dermatology  Kell Crowley MD  [phone]      Zee TRIANA  1960    64 y.o. female     Date of Visit: 1/20/2025    Chief Complaint: f/u skin cancer, moles/lesions  Chief Complaint   Patient presents with    Skin Exam     Last seen: 7-2024   *daughter is a patient - [employer] in [place] - sports broadcasting  *younger daughter going to [employer] - studying neuroscience; studied abroad summer 2024    *had some family probs in 2019 - spouse had probs with alcohol - has changed emergency contacts to her sister   *eventually moving to [place] years    History of Present Illness:    1. F/u rosacea - seen previously w flaring eruption/erythema on the FH and was itchy, hot, flaky.    Resolved with minocycline and MetroCream and has not needed either recently.    2. Here for f/u for skin cancer.  Hx of NMSC - here for full skin check.    Most recently s/p Mohs 4-2024 (5 stages) for BCC on the L lateral brow/temple - has healed very well.  Initially felt tight but feels normal now and looks great.    No probs with previous sites.  No new concerns.    3. She is also here for evaluation of multiple asx nevi on the trunk and extremities, present for many years; no change in size/shape/color of any lesions; no bleeding lesions.  She also has a DF on the L thigh that is asx and unchanged.  No personal hx of skin cancer.  Father with hx of NMSC.     4. Lentigines on the face/chest.  She had IPL in the past with marked improvement in lentigines and no complications.  No other trx.    5. F/u AK's - few new concerns on the L FH.  Asx.     6. F/u seb derm/dermatitis - clobetasol soln helps scalp prn flares and TAC on the ears and hairline prn flares.  Previously noted that she is using bio-oil all over the face and better control of dryness w this.    Facial eruption 12/18/24 - no changes in products.  Unsure of trigger.  Itchy and erythematous in the periorbital area and cheeks.

## 2025-01-23 LAB — DERMATOLOGY PATHOLOGY REPORT: NORMAL

## 2025-07-19 ENCOUNTER — PATIENT MESSAGE (OUTPATIENT)
Age: 65
End: 2025-07-19

## 2025-07-22 ENCOUNTER — OFFICE VISIT (OUTPATIENT)
Age: 65
End: 2025-07-22
Payer: COMMERCIAL

## 2025-07-22 DIAGNOSIS — L81.4 LENTIGINES: ICD-10-CM

## 2025-07-22 DIAGNOSIS — L21.9 SEBORRHEIC DERMATITIS: ICD-10-CM

## 2025-07-22 DIAGNOSIS — L82.0 INFLAMED SEBORRHEIC KERATOSIS: ICD-10-CM

## 2025-07-22 DIAGNOSIS — L71.9 ROSACEA: Primary | ICD-10-CM

## 2025-07-22 DIAGNOSIS — L30.9 DERMATITIS: ICD-10-CM

## 2025-07-22 DIAGNOSIS — Z85.828 HISTORY OF NONMELANOMA SKIN CANCER: ICD-10-CM

## 2025-07-22 DIAGNOSIS — D22.9 MULTIPLE NEVI: ICD-10-CM

## 2025-07-22 DIAGNOSIS — D48.5 NEOPLASM OF UNCERTAIN BEHAVIOR OF SKIN: ICD-10-CM

## 2025-07-22 DIAGNOSIS — L57.0 AK (ACTINIC KERATOSIS): ICD-10-CM

## 2025-07-22 PROCEDURE — 11102 TANGNTL BX SKIN SINGLE LES: CPT | Performed by: DERMATOLOGY

## 2025-07-22 PROCEDURE — 99214 OFFICE O/P EST MOD 30 MIN: CPT | Performed by: DERMATOLOGY

## 2025-07-22 PROCEDURE — 17110 DESTRUCTION B9 LES UP TO 14: CPT | Performed by: DERMATOLOGY

## 2025-07-22 NOTE — PATIENT INSTRUCTIONS
Biopsy Wound Care Instructions    Keep the bandage in place for 24 hours.   Cleanse the wound with mild soapy water daily  Gently dry the area.  Apply Vaseline or petroleum jelly to the wound using a cotton tipped applicator.  Cover with a clean bandage.  Repeat this process until the biopsy site is healed.  If you had stitches placed, continue treating the site until the stitches are removed. Remember to make an appointment to return to have your stitches removed by our staff.  You may shower and bathe as usual.       ** Biopsy results generally take around 7 business days to come back.  If you have not heard from us by then, please call the office at [phone].    *Please note that biopsy results are released to both the patient and physician at the same time in [place].  Please allow time for your physician to review the results.  One of our staff members will reach out to you with the results and plan.

## 2025-07-22 NOTE — PROGRESS NOTES
[facility] Dermatology  Kell Crowley MD  [phone]      Zee TRIANA  1960    64 y.o. female     Date of Visit: 7/22/2025    Chief Complaint: f/u skin cancer, moles/lesions  Chief Complaint   Patient presents with    Skin Exam     Last seen: 1-2025   *daughter is a patient - [employer] in [place] - sports broadcasting  *younger daughter going to [employer] - studying neuroscience; studied abroad summer 2024    *had some family probs in 2019 - spouse had probs with alcohol - has changed emergency contacts to her sister   *[place] be eventually moving to [place] years    History of Present Illness:    1. F/u rosacea - seen previously w flaring eruption/erythema on the FH and was itchy, hot, flaky.    Resolved with minocycline and MetroCream and has not needed either recently.    2. Here for f/u for skin cancer.  Hx of NMSC - here for full skin check.    Most recently s/p Mohs 4-2024 (5 stages) for BCC on the L lateral brow/temple - has healed very well.  Initially felt tight but feels normal now and looks great.    No probs with previous sites.  No new concerns.    3. She is also here for evaluation of multiple asx nevi on the trunk and extremities, present for many years; no change in size/shape/color of any lesions; no bleeding lesions.  She also has a DF on the L thigh that is asx and unchanged.  No personal hx of skin cancer.  Father with hx of NMSC.     4. Lentigines on the face/chest.  She had IPL in the past with marked improvement in lentigines and no complications.  No other trx.    5. F/u AK's - improved s/p field trx  Treated face with 5-FU 3-2025 x 10 days.    6. F/u seb derm/dermatitis - clobetasol soln helps scalp prn flares and TAC on the ears and hairline prn flares.  Previously noted that she was using bio-oil all over the face and better control of dryness w this.    Facial eruption 12/18/24 - no changes in products.  Unsure of trigger.  Itchy and erythematous in

## 2025-07-25 LAB — DERMATOLOGY PATHOLOGY REPORT: NORMAL
